# Patient Record
Sex: MALE | Race: WHITE
[De-identification: names, ages, dates, MRNs, and addresses within clinical notes are randomized per-mention and may not be internally consistent; named-entity substitution may affect disease eponyms.]

---

## 2019-06-01 ENCOUNTER — HOSPITAL ENCOUNTER (INPATIENT)
Dept: HOSPITAL 92 - ERS | Age: 39
LOS: 1 days | Discharge: HOME | DRG: 155 | End: 2019-06-02
Attending: SURGERY | Admitting: SURGERY
Payer: COMMERCIAL

## 2019-06-01 VITALS — BODY MASS INDEX: 27 KG/M2

## 2019-06-01 DIAGNOSIS — S06.0X0A: ICD-10-CM
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
## 2019-06-01 DIAGNOSIS — S01.81XA: ICD-10-CM

## 2019-06-01 DIAGNOSIS — S02.2XXA: Primary | ICD-10-CM

## 2019-06-01 DIAGNOSIS — S22.20XA: ICD-10-CM

## 2019-06-01 DIAGNOSIS — F10.129: ICD-10-CM

## 2019-06-01 DIAGNOSIS — F17.210: ICD-10-CM

## 2019-06-01 LAB
ALBUMIN SERPL BCG-MCNC: 4.5 G/DL (ref 3.5–5)
ALP SERPL-CCNC: 57 U/L (ref 40–150)
ALT SERPL W P-5'-P-CCNC: 66 U/L (ref 8–55)
ANION GAP SERPL CALC-SCNC: 19 MMOL/L (ref 10–20)
APTT PPP: 24.6 SEC (ref 22.9–36.1)
AST SERPL-CCNC: 62 U/L (ref 5–34)
BASOPHILS # BLD AUTO: 0 THOU/UL (ref 0–0.2)
BASOPHILS NFR BLD AUTO: 0.1 % (ref 0–1)
BILIRUB SERPL-MCNC: 0.5 MG/DL (ref 0.2–1.2)
BUN SERPL-MCNC: 17 MG/DL (ref 8.9–20.6)
CALCIUM SERPL-MCNC: 9.5 MG/DL (ref 7.8–10.44)
CHLORIDE SERPL-SCNC: 107 MMOL/L (ref 98–107)
CO2 SERPL-SCNC: 20 MMOL/L (ref 22–29)
CREAT CL PREDICTED SERPL C-G-VRATE: 0 ML/MIN (ref 70–130)
EOSINOPHIL # BLD AUTO: 0 THOU/UL (ref 0–0.7)
EOSINOPHIL NFR BLD AUTO: 0.3 % (ref 0–10)
GLOBULIN SER CALC-MCNC: 2.9 G/DL (ref 2.4–3.5)
GLUCOSE SERPL-MCNC: 126 MG/DL (ref 70–105)
HGB BLD-MCNC: 13.7 G/DL (ref 14–18)
INR PPP: 1.1
LIPASE SERPL-CCNC: 23 U/L (ref 8–78)
LYMPHOCYTES # BLD: 0.8 THOU/UL (ref 1.2–3.4)
LYMPHOCYTES NFR BLD AUTO: 6.9 % (ref 21–51)
MCH RBC QN AUTO: 30.9 PG (ref 27–31)
MCV RBC AUTO: 90.9 FL (ref 78–98)
MONOCYTES # BLD AUTO: 0.7 THOU/UL (ref 0.11–0.59)
MONOCYTES NFR BLD AUTO: 6 % (ref 0–10)
NEUTROPHILS # BLD AUTO: 10 THOU/UL (ref 1.4–6.5)
NEUTROPHILS NFR BLD AUTO: 86.7 % (ref 42–75)
PLATELET # BLD AUTO: 170 THOU/UL (ref 130–400)
POTASSIUM SERPL-SCNC: 3.8 MMOL/L (ref 3.5–5.1)
PROTHROMBIN TIME: 14.1 SEC (ref 12–14.7)
RBC # BLD AUTO: 4.44 MILL/UL (ref 4.7–6.1)
SODIUM SERPL-SCNC: 142 MMOL/L (ref 136–145)
WBC # BLD AUTO: 11.5 THOU/UL (ref 4.8–10.8)

## 2019-06-01 PROCEDURE — 85730 THROMBOPLASTIN TIME PARTIAL: CPT

## 2019-06-01 PROCEDURE — 71260 CT THORAX DX C+: CPT

## 2019-06-01 PROCEDURE — 72125 CT NECK SPINE W/O DYE: CPT

## 2019-06-01 PROCEDURE — 94760 N-INVAS EAR/PLS OXIMETRY 1: CPT

## 2019-06-01 PROCEDURE — G0390 TRAUMA RESPONS W/HOSP CRITI: HCPCS

## 2019-06-01 PROCEDURE — 90715 TDAP VACCINE 7 YRS/> IM: CPT

## 2019-06-01 PROCEDURE — 83605 ASSAY OF LACTIC ACID: CPT

## 2019-06-01 PROCEDURE — 12015 RPR F/E/E/N/L/M 7.6-12.5 CM: CPT

## 2019-06-01 PROCEDURE — 84100 ASSAY OF PHOSPHORUS: CPT

## 2019-06-01 PROCEDURE — 96361 HYDRATE IV INFUSION ADD-ON: CPT

## 2019-06-01 PROCEDURE — 96360 HYDRATION IV INFUSION INIT: CPT

## 2019-06-01 PROCEDURE — 36415 COLL VENOUS BLD VENIPUNCTURE: CPT

## 2019-06-01 PROCEDURE — 70486 CT MAXILLOFACIAL W/O DYE: CPT

## 2019-06-01 PROCEDURE — 80307 DRUG TEST PRSMV CHEM ANLYZR: CPT

## 2019-06-01 PROCEDURE — 83735 ASSAY OF MAGNESIUM: CPT

## 2019-06-01 PROCEDURE — 85610 PROTHROMBIN TIME: CPT

## 2019-06-01 PROCEDURE — 71045 X-RAY EXAM CHEST 1 VIEW: CPT

## 2019-06-01 PROCEDURE — 70450 CT HEAD/BRAIN W/O DYE: CPT

## 2019-06-01 PROCEDURE — 90471 IMMUNIZATION ADMIN: CPT

## 2019-06-01 PROCEDURE — 85025 COMPLETE CBC W/AUTO DIFF WBC: CPT

## 2019-06-01 PROCEDURE — 74177 CT ABD & PELVIS W/CONTRAST: CPT

## 2019-06-01 PROCEDURE — 80053 COMPREHEN METABOLIC PANEL: CPT

## 2019-06-01 PROCEDURE — 83690 ASSAY OF LIPASE: CPT
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
## 2019-06-01 NOTE — CT
CHEST CT WITH CONTRAST

ABDOMEN CT WITH CONTRAST

PELVIC CT WITH COTNRAST

LIMITED CT OF THE THORACIC AND CLS:

 

FINDINGS: 

 

CHEST CT:

No mediastinal mass, lymphadenopathy, or hematoma.  Heart size is normal.  No pericardial effusion.  
Thoracic and abdominal aorta have a normal caliber.  No periaortic fat stranding.  Trachea and centra
l bronchi are patent.  Dependent atelectatic changes.

 

LEFT LUNG:

No masses or consolidation.

 

RIGHT LUNG:

No masses or consolidation.

 

No pneumothorax.

 

 

ABDOMEN CT:

Appropriate enhancement of the solid organs.

 

No mesenteric mass, lymphadenopathy, free air, or free fluid. 

 

Limited evaluation of the alimentary canal.  No evidence of bowel obstruction.  Ileocecal junction is
 normal.  Normal-caliber appendix..  Fecal material in a nondistended, nondilated colon.  Occasional 
diverticulum.

 

Symmetric enhancement of the kidneys.  No obstructive uropathy.  Nonobstructing calculi in the upper 
pole of the right kidney.

 

Nonspecific calcifications in the portal confluence.  

 

 

CT PELVIS:

Unremarkable urinary bladder.  No pelvic mass, lymphadenopathy, free air, or free fluid.

 

Bony thorax and bony pelvis appear to be intact.  There is a nondisplaced fracture involving the supe
rior body of the sternum (sagittal image #37, series 402 and axial image 27, series 2). 

 

 

CT OF THE THORACIC AND LUMBAR SPINE:

No fractures.  No malalignment.

 

IMPRESSION: 

1.  No significant posttraumatic change in the chest, abdomen, or pelvis.

 

2.  Nondisplaced fracture involving the sternum.

 

3.  Results of the study discussed with Dr. Matthias Palmer 6/1/2019 at 8:35 a.m.

 

 

CODE CR

 

POS: OFF
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
## 2019-06-02 VITALS — SYSTOLIC BLOOD PRESSURE: 144 MMHG | TEMPERATURE: 98 F | DIASTOLIC BLOOD PRESSURE: 72 MMHG

## 2019-06-02 LAB
ALBUMIN SERPL BCG-MCNC: 3.9 G/DL (ref 3.5–5)
ALP SERPL-CCNC: 46 U/L (ref 40–150)
ALT SERPL W P-5'-P-CCNC: 41 U/L (ref 8–55)
ANION GAP SERPL CALC-SCNC: 9 MMOL/L (ref 10–20)
AST SERPL-CCNC: 33 U/L (ref 5–34)
BASOPHILS # BLD AUTO: 0 THOU/UL (ref 0–0.2)
BASOPHILS NFR BLD AUTO: 0.1 % (ref 0–1)
BILIRUB SERPL-MCNC: 1.7 MG/DL (ref 0.2–1.2)
BUN SERPL-MCNC: 10 MG/DL (ref 8.9–20.6)
CALCIUM SERPL-MCNC: 9 MG/DL (ref 7.8–10.44)
CHLORIDE SERPL-SCNC: 106 MMOL/L (ref 98–107)
CO2 SERPL-SCNC: 30 MMOL/L (ref 22–29)
CREAT CL PREDICTED SERPL C-G-VRATE: 142 ML/MIN (ref 70–130)
EOSINOPHIL # BLD AUTO: 0.1 THOU/UL (ref 0–0.7)
EOSINOPHIL NFR BLD AUTO: 1 % (ref 0–10)
GLOBULIN SER CALC-MCNC: 2.2 G/DL (ref 2.4–3.5)
GLUCOSE SERPL-MCNC: 104 MG/DL (ref 70–105)
HGB BLD-MCNC: 11.8 G/DL (ref 14–18)
LYMPHOCYTES # BLD: 1.3 THOU/UL (ref 1.2–3.4)
LYMPHOCYTES NFR BLD AUTO: 21.5 % (ref 21–51)
MAGNESIUM SERPL-MCNC: 2.1 MG/DL (ref 1.6–2.6)
MCH RBC QN AUTO: 31.1 PG (ref 27–31)
MCV RBC AUTO: 93.3 FL (ref 78–98)
MONOCYTES # BLD AUTO: 0.4 THOU/UL (ref 0.11–0.59)
MONOCYTES NFR BLD AUTO: 7.1 % (ref 0–10)
NEUTROPHILS # BLD AUTO: 4.2 THOU/UL (ref 1.4–6.5)
NEUTROPHILS NFR BLD AUTO: 70.2 % (ref 42–75)
PLATELET # BLD AUTO: 117 THOU/UL (ref 130–400)
POTASSIUM SERPL-SCNC: 3.9 MMOL/L (ref 3.5–5.1)
RBC # BLD AUTO: 3.79 MILL/UL (ref 4.7–6.1)
SODIUM SERPL-SCNC: 141 MMOL/L (ref 136–145)
WBC # BLD AUTO: 6 THOU/UL (ref 4.8–10.8)
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
## 2019-06-02 NOTE — RAD
CHEST 1 VIEW:

 

HISTORY: 

Sternal fracture.

 

COMPARISON: 

None.

 

FINDINGS: 

Normal cardiac silhouette.  Lungs and pleural spaces are clear.  No pneumothorax or osseous abnormali
ties.

 

IMPRESSION: 

No acute cardiopulmonary process.

 

POS: OFF
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
